# Patient Record
Sex: FEMALE | Race: AMERICAN INDIAN OR ALASKA NATIVE
[De-identification: names, ages, dates, MRNs, and addresses within clinical notes are randomized per-mention and may not be internally consistent; named-entity substitution may affect disease eponyms.]

---

## 2020-04-04 ENCOUNTER — HOSPITAL ENCOUNTER (EMERGENCY)
Dept: HOSPITAL 5 - ED | Age: 20
Discharge: HOME | End: 2020-04-04
Payer: SELF-PAY

## 2020-04-04 VITALS — SYSTOLIC BLOOD PRESSURE: 126 MMHG | DIASTOLIC BLOOD PRESSURE: 83 MMHG

## 2020-04-04 DIAGNOSIS — F41.9: ICD-10-CM

## 2020-04-04 DIAGNOSIS — R06.02: Primary | ICD-10-CM

## 2020-04-04 DIAGNOSIS — Z79.899: ICD-10-CM

## 2020-04-04 DIAGNOSIS — J45.909: ICD-10-CM

## 2020-04-04 DIAGNOSIS — R07.89: ICD-10-CM

## 2020-04-04 DIAGNOSIS — K21.9: ICD-10-CM

## 2020-04-04 PROCEDURE — 93005 ELECTROCARDIOGRAM TRACING: CPT

## 2020-04-04 PROCEDURE — 71046 X-RAY EXAM CHEST 2 VIEWS: CPT

## 2020-04-04 PROCEDURE — 93010 ELECTROCARDIOGRAM REPORT: CPT

## 2020-04-04 NOTE — EMERGENCY DEPARTMENT REPORT
ED General Adult HPI





- General


Chief complaint: Dyspnea/Respdistress


Stated complaint: PANIC ATTACH/HOT/CP/BACK PAIN


Time Seen by Provider: 04/04/20 12:31


Source: patient


Mode of arrival: Ambulatory


Limitations: No Limitations





- History of Present Illness


Initial comments: 





Patient is a 19-year-old female presents emergency room with complaints of chest

pain that began yesterday.  She states that felt like a tightness.  She states 

that she began to feel anxious and was hyperventilating and believes she had a 

panic attack.  She states that she has a history of anxiety and uses buspirone. 

She states that she also has a history of acid reflux and believes her chest 

pain is due to the reflux.  She states that she has been taking pantoprazole for

2 to 3 months without much relief from the acid reflux.  She has not seen a GI 

doctor or primary care doctor.  She states she also has a history of asthma and 

GERD.  She denies any nausea, vomiting, diarrhea, fever, cough, leg swelling.  

She denies any recent travel, recent surgery, sick contacts, hormone use.  She 

states that she has an appointment with a cardiologist on May 4, 2020.





- Related Data


                                  Previous Rx's











 Medication  Instructions  Recorded  Last Taken  Type


 


Omeprazole 20 mg PO QAM #60 capsule. 04/04/20 Unknown Rx











                                    Allergies











Allergy/AdvReac Type Severity Reaction Status Date / Time


 


No Known Allergies Allergy   Verified 04/04/20 12:03














ED Review of Systems


ROS: 


Stated complaint: PANIC ATTACH/HOT/CP/BACK PAIN


Other details as noted in HPI





Comment: All other systems reviewed and negative





ED Past Medical Hx





- Past Medical History


Previous Medical History?: Yes


Hx Asthma: Yes





- Surgical History


Past Surgical History?: No





- Social History


Smoking Status: Never Smoker


Substance Use Type: None





- Medications


Home Medications: 


                                Home Medications











 Medication  Instructions  Recorded  Confirmed  Last Taken  Type


 


Omeprazole 20 mg PO QAM #60 capsule. 04/04/20  Unknown Rx














ED Physical Exam





- General


Limitations: No Limitations


General appearance: alert, in no apparent distress





- Head


Head exam: Present: atraumatic, normocephalic





- Eye


Eye exam: Present: normal appearance





- ENT


ENT exam: Present: mucous membranes moist





- Respiratory


Respiratory exam: Present: normal lung sounds bilaterally.  Absent: respiratory 

distress, wheezes, rales, rhonchi, stridor, chest wall tenderness, accessory 

muscle use, decreased breath sounds, prolonged expiratory





- Cardiovascular


Cardiovascular Exam: Present: regular rate, normal rhythm, normal heart sounds. 

Absent: systolic murmur, diastolic murmur, rubs, gallop





- Extremities Exam


Extremities exam: Absent: pedal edema





- Neurological Exam


Neurological exam: Present: alert, oriented X3





- Psychiatric


Psychiatric exam: Present: normal affect, normal mood





- Skin


Skin exam: Present: warm, dry, intact





ED Course


                                   Vital Signs











  04/04/20





  12:03


 


Temperature 98.2 F


 


Pulse Rate 98 H


 


Respiratory 16





Rate 


 


Blood Pressure 126/83





[Left] 


 


O2 Sat by Pulse 99





Oximetry 














ED Medical Decision Making





- EKG Data


EKG shows normal: sinus rhythm, axis, intervals, QRS complexes, ST-T waves


Rate: normal





- Radiology Data


Radiology results: report reviewed





CHEST 2 VIEWS 





INDICATION / CLINICAL INFORMATION: 


Chest pain and upper back pain. 





COMPARISON: 


None available. 





FINDINGS: 





SUPPORT DEVICES: None. 


HEART / MEDIASTINUM: No significant abnormality. 


LUNGS / PLEURA: No significant pulmonary or pleural abnormality. No 

pneumothorax. 





ADDITIONAL FINDINGS: No significant additional findings. 





IMPRESSION: 


1. No acute abnormality of the chest. 





Signer Name: Mir Barraagn MD 


Signed: 4/4/2020 1:18 PM 


Workstation Name: [id number] 








 Transcribed By: MN 


 Dictated By: Mir Barragan MD 


 Electronically Authenticated By: Mir Barragan MD 


 Signed Date/Time: 04/04/20 1318 











 DD/DT: 04/04/20 1317 


 TD/TT: 





- Medical Decision Making





Patient is a 19-year-old female presents emergency room with complaints of chest

 pain that began yesterday.  She states that felt like a tightness.  She states 

that she began to feel anxious and was hyperventilating and believes she had a 

panic attack.  She states that she has a history of anxiety and uses buspirone. 

 She states that she also has a history of acid reflux and believes her chest 

pain is due to the reflux.  She states that she has been taking pantoprazole for

 2 to 3 months without much relief from the acid reflux.  She has not seen a GI 

doctor or primary care doctor.  She states she also has a history of asthma and 

GERD.  She denies any nausea, vomiting, diarrhea, fever, cough, leg swelling.  

She denies any recent travel, recent surgery, sick contacts, hormone use.  She 

states that she has an appointment with a cardiologist on May 4, 2020.  Vitals 

are normal.  EKG within normal limits.  Chest x-ray with no acute process.  

Patient given Levsin and Maalox and symptoms improved.  PERC criteria negative 

for PE.  pt does not have any cardiac risk factors, unlikely to be cardiac 

etiology.  Most likely secondary to anxiety and GERD.  Patient will be referred 

to outpatient cardiology and GI.  Patient given prescription for omeprazole. 

advised pt Please take medication as prescribed.  Please follow-up with a 

primary care doctor.  Please follow-up with your cardiologist.  Please follow-up

 with a GI doctor.  Increase your water intake.  Please follow the diet for acid

 reflux.  Return to the emergency room for any new or worsening symptoms.





- Differential Diagnosis


anxiety, GERD, PUD, PTX, HCM, arrhythmia, PVC


Critical care attestation.: 


If time is entered above; I have spent that time in minutes in the direct care 

of this critically ill patient, excluding procedure time.








ED Disposition


Clinical Impression: 


 SOB (shortness of breath), Anxiety





Chest pain


Qualifiers:


 Chest pain type: unspecified Qualified Code(s): R07.9 - Chest pain, unspecified





GERD (gastroesophageal reflux disease)


Qualifiers:


 Esophagitis presence: without esophagitis Qualified Code(s): K21.9 - Gastro-

esophageal reflux disease without esophagitis





Disposition: DC-01 TO HOME OR SELFCARE


Is pt being admited?: No


Does the pt Need Aspirin: No


Condition: Stable


Instructions:  Chest Pain (ED), Gastroesophageal Reflux in Children (ED), Diet 

for Ulcers and Gastritis (ED), Anxiety (ED)


Additional Instructions: 


Please take medication as prescribed.  Please follow-up with a primary care 

doctor.  Please follow-up with your cardiologist.  Please follow-up with a GI 

doctor.  Increase your water intake.  Please follow the diet for acid reflux.  

Return to the emergency room for any new or worsening symptoms.


Prescriptions: 


Omeprazole 20 mg PO QAM #60 capsule.


Referrals: 


JUSTICE JOSEPH MD [Staff Physician] - 3-5 Days


[facility] [Outside] - 3-5 Days


[facility] [Outside] - 3-5 Days


[facility] [Provider Group] - 3-5 Days


[facility] [Provider Group] - 3-5 Days


MANUELITO BAUM MD [Staff Physician] - 3-5 Days


Time of Disposition: 13:56

## 2020-04-04 NOTE — XRAY REPORT
CHEST 2 VIEWS 



INDICATION / CLINICAL INFORMATION:

Chest pain and upper back pain.



COMPARISON: 

None available.



FINDINGS:



SUPPORT DEVICES: None.

HEART / MEDIASTINUM: No significant abnormality. 

LUNGS / PLEURA: No significant pulmonary or pleural abnormality. No pneumothorax. 



ADDITIONAL FINDINGS: No significant additional findings.



IMPRESSION:

1. No acute abnormality of the chest.



Signer Name: Mir Barragan MD 

Signed: 4/4/2020 1:18 PM

Workstation Name: [id number]